# Patient Record
Sex: MALE | Race: OTHER | HISPANIC OR LATINO | Employment: STUDENT | URBAN - METROPOLITAN AREA
[De-identification: names, ages, dates, MRNs, and addresses within clinical notes are randomized per-mention and may not be internally consistent; named-entity substitution may affect disease eponyms.]

---

## 2021-07-01 ENCOUNTER — PATIENT MESSAGE (OUTPATIENT)
Dept: ADMINISTRATIVE | Facility: OTHER | Age: 21
End: 2021-07-01

## 2022-07-01 PROBLEM — K62.89 PERIANAL PAIN: Status: ACTIVE | Noted: 2022-07-01

## 2022-07-01 PROBLEM — E66.812 OBESITY, CLASS II, BMI 35-39.9: Status: ACTIVE | Noted: 2022-07-01

## 2022-07-01 PROBLEM — E66.9 OBESITY, CLASS II, BMI 35-39.9: Status: ACTIVE | Noted: 2022-07-01

## 2022-07-01 PROBLEM — K61.0 PERIANAL ABSCESS: Status: ACTIVE | Noted: 2022-07-01

## 2022-08-29 PROBLEM — D12.4 BENIGN NEOPLASM OF DESCENDING COLON: Status: ACTIVE | Noted: 2022-08-29

## 2022-08-29 PROBLEM — R19.7 DIARRHEA: Status: ACTIVE | Noted: 2022-08-29

## 2022-08-29 PROBLEM — D12.5 BENIGN NEOPLASM OF SIGMOID COLON: Status: ACTIVE | Noted: 2022-08-29

## 2022-09-02 PROBLEM — C18.6 MALIGNANT NEOPLASM OF DESCENDING COLON: Status: ACTIVE | Noted: 2022-09-02

## 2022-10-13 PROBLEM — Z90.49 S/P LEFT HEMICOLECTOMY: Status: ACTIVE | Noted: 2022-10-13

## 2022-10-13 PROBLEM — M79.A22 NONTRAUMATIC COMPARTMENT SYNDROME OF LEFT LOWER EXTREMITY: Status: ACTIVE | Noted: 2022-10-13

## 2022-11-14 PROBLEM — T79.A22A TRAUMATIC COMPARTMENT SYNDROME OF LEFT LOWER EXTREMITY: Status: ACTIVE | Noted: 2022-11-14

## 2022-12-27 ENCOUNTER — NURSE TRIAGE (OUTPATIENT)
Dept: ADMINISTRATIVE | Facility: CLINIC | Age: 22
End: 2022-12-27

## 2022-12-28 NOTE — TELEPHONE ENCOUNTER
"Spoke with patient states he has 6/10 left side lower abdominal pain where he previously had a colectomy.  Patient describes pain as being unfamiliar and sharp.  Procedure was done in October.  Patient also reports having blood in his stool.  Advised patient to go to ER and have another adult drive. Patient verbalized understanding.      Reason for Disposition   Black or tarry bowel movements (Exception: chronic-unchanged black-grey bowel movements AND is taking iron pills or Pepto-bismol)   Blood in bowel movements  (Exception: Blood on surface of BM with constipation)    Additional Information   Negative: Shock suspected (e.g., cold/pale/clammy skin, too weak to stand, low BP, rapid pulse)   Negative: Difficult to awaken or acting confused (e.g., disoriented, slurred speech)   Negative: Passed out (i.e., lost consciousness, collapsed and was not responding)   Negative: [1] Vomiting AND [2] contains red blood or black ("coffee ground") material  (Exception: few red streaks in vomit that only happened once)   Negative: Sounds like a life-threatening emergency to the triager   Negative: SEVERE rectal bleeding (large blood clots; constant or on and off bleeding)   Negative: [1] MODERATE rectal bleeding (small blood clots, passing blood without stool, or toilet water turns red) AND [2] more than once a day   Negative: SEVERE dizziness (e.g., unable to stand, requires support to walk, feels like passing out now)   Negative: Pale skin (pallor) of new-onset or worsening   Negative: Shock suspected (e.g., cold/pale/clammy skin, too weak to stand, low BP, rapid pulse)   Negative: Difficult to awaken or acting confused (e.g., disoriented, slurred speech)   Negative: Passed out (i.e., lost consciousness, collapsed and was not responding)   Negative: Sounds like a life-threatening emergency to the triager   Negative: [1] SEVERE pain (e.g., excruciating) AND [2] present > 1 hour   Negative: [1] SEVERE pain AND [2] age > 60 " "years   Negative: [1] Vomiting AND [2] contains red blood or black ("coffee ground") material  (Exception: few red streaks in vomit that only happened once)    Protocols used: Rectal Bleeding-A-[facility], Abdominal Pain - Male-A-AH    "

## 2023-04-24 PROBLEM — I81 PORTAL VEIN THROMBOSIS: Status: ACTIVE | Noted: 2023-04-24

## 2023-04-24 PROBLEM — K92.1 HEMATOCHEZIA: Status: ACTIVE | Noted: 2023-04-24

## 2025-03-06 PROBLEM — Z13.79 GENETIC TESTING: Status: ACTIVE | Noted: 2025-03-06

## 2025-06-17 ENCOUNTER — OCHSNER VIRTUAL EMERGENCY DEPARTMENT (OUTPATIENT)
Facility: CLINIC | Age: 25
End: 2025-06-17

## 2025-06-17 ENCOUNTER — NURSE TRIAGE (OUTPATIENT)
Dept: ADMINISTRATIVE | Facility: CLINIC | Age: 25
End: 2025-06-17

## 2025-06-17 ENCOUNTER — PATIENT OUTREACH (OUTPATIENT)
Facility: OTHER | Age: 25
End: 2025-06-17

## 2025-06-17 NOTE — TELEPHONE ENCOUNTER
Pt has colon cancer. Pt stated he has been having pain on his right side above his hip since Monday before last. Last Wednesday or Thursday he had blood in his stool. He has some sharp pains sometimes. He went to Er on yesterday. And told to follow up with GI MD. Pt went to [facility] ER yesterday. Pt is calling today to schedule an appt with GI. Care advice recommends pt see MD today or tomorrow. [facility] provider Dr Stephens notified and stated for pt to GI. Appt given.   Reason for Disposition   Patient wants to be seen    Additional Information   Negative: Shock suspected (e.g., cold/pale/clammy skin, too weak to stand, low BP, rapid pulse)   Negative: Difficult to awaken or acting confused (e.g., disoriented, slurred speech)   Negative: Sounds like a life-threatening emergency to the triager   Negative: Drinking very little and dehydration suspected (e.g., no urine > 12 hours, very dry mouth, very lightheaded)   Negative: Patient sounds very sick or weak to the triager   Negative: Fever > 104 F  (40 C)   Negative: Caller has URGENT question and triager unable to answer question   Negative: SEVERE pain (e.g., excruciating, pain scale 8-10) and not improved after pain medications   Negative: Recent medical visit within 24 hours and condition/symptoms WORSE   Negative: Recent medical visit within 24 hours and NEW symptom that could be serious   Negative: Caller has URGENT question (includes prescribed medication questions) and triager unable to answer question    Protocols used: Recent Medical Visit for Illness Follow-up Call-A-[facility]

## 2025-06-17 NOTE — PLAN OF CARE-OVED
[facility] Plan of Care Note  Referral Source: Nurse On-Call                               Chief Complaint   Patient presents with    GI Bleeding     Pt has colon cancer. Pt stated he has been having pain on his right side above his hip since Monday before last. Last Wednesday or Thursday he had blood in his stool. He has some sharp pains sometimes. He went to Er on yesterday. And told to follow up with GI MD. Pt went to [facility] ER yesterday. Pt is calling today to schedule an appt with GI.     Recommendation: Specialist Referral         Specialty: Gastroenterology             Recommendation comment: Appointment made for today

## 2025-06-17 NOTE — PROGRESS NOTES
"Patient spoke with [facility] RN on 6/17/2025 with complaint of "Pt has colon cancer. Pt stated he has been having pain on his right side above his hip since Monday before last. Last Wednesday or Thursday he had blood in his stool. He has some sharp pains sometimes. He went to Er on yesterday. And told to follow up with GI MD. Pt went to [facility] ER yesterday. Pt is calling today to schedule an appt with GI."    [facility] RN consulted with [facility] provider, Dr. Stephens, and disposition recommended was specialty/gastroenterology follow up.  Patient scheduled same day gastroenterology appointment at 1:30 pm with Wong Block MD.    Will follow up with patient during the week of June 18-22, 2025 to assess for any additional concerns/needs to be addressed.  "

## 2025-06-20 ENCOUNTER — PATIENT OUTREACH (OUTPATIENT)
Facility: OTHER | Age: 25
End: 2025-06-20

## 2025-06-20 NOTE — PROGRESS NOTES
Attempted to contact pt for a follow-up call, after speaking with [facility] and consulting [facility] on 6/17, but unable to reach pt and could not leave a VM. Next scheduled follow-up is for 6/25/2025.    Vivian Vega ED Navigator  [phone]

## 2025-06-30 PROBLEM — K92.1 BLOOD IN STOOL: Status: ACTIVE | Noted: 2025-06-30

## 2025-07-03 ENCOUNTER — PATIENT OUTREACH (OUTPATIENT)
Facility: OTHER | Age: 25
End: 2025-07-03

## 2025-07-03 NOTE — PROGRESS NOTES
Patient contacted [facility] RN on 6/17/25 with c/o right side pain and blood in stool.  [facility] was consulted, and the disposition was Specialty Referral-Gastroenterology.  Patient was scheduled same day gastroenterology appointment at 1:30 pm with Wong Block MD.     Spoke with patient on the telephone for appointment follow up.  Patient reported all needs were addressed at the appointment, and his provider scheduled a colonoscopy, which was completed on 06/27/25.  Patient stated his GI provider has also ordered an EGD which is scheduled 7/21/25.  Patient denied additional scheduling needs and reported no concerns at this time.    Kristin Ortiz  ED Navigator